# Patient Record
Sex: MALE | Race: WHITE | HISPANIC OR LATINO
[De-identification: names, ages, dates, MRNs, and addresses within clinical notes are randomized per-mention and may not be internally consistent; named-entity substitution may affect disease eponyms.]

---

## 2018-07-27 PROBLEM — Z00.00 ENCOUNTER FOR PREVENTIVE HEALTH EXAMINATION: Status: ACTIVE | Noted: 2018-07-27

## 2018-08-16 ENCOUNTER — APPOINTMENT (OUTPATIENT)
Dept: OTHER | Facility: CLINIC | Age: 58
End: 2018-08-16
Payer: COMMERCIAL

## 2018-08-16 VITALS
HEART RATE: 80 BPM | WEIGHT: 210 LBS | DIASTOLIC BLOOD PRESSURE: 96 MMHG | SYSTOLIC BLOOD PRESSURE: 135 MMHG | OXYGEN SATURATION: 98 % | HEIGHT: 68 IN | BODY MASS INDEX: 31.83 KG/M2 | RESPIRATION RATE: 16 BRPM

## 2018-08-16 DIAGNOSIS — Z03.89 ENCOUNTER FOR OBSERVATION FOR OTHER SUSPECTED DISEASES AND CONDITIONS RULED OUT: ICD-10-CM

## 2018-08-16 DIAGNOSIS — Z78.9 OTHER SPECIFIED HEALTH STATUS: ICD-10-CM

## 2018-08-16 PROCEDURE — 96150: CPT

## 2018-08-16 PROCEDURE — 94060 EVALUATION OF WHEEZING: CPT

## 2018-08-16 PROCEDURE — 99386 PREV VISIT NEW AGE 40-64: CPT

## 2018-08-17 LAB
APPEARANCE: CLEAR
BACTERIA: NEGATIVE
BASOPHILS # BLD AUTO: 0.04 K/UL
BASOPHILS NFR BLD AUTO: 0.5 %
BILIRUBIN URINE: NEGATIVE
BLOOD URINE: NEGATIVE
COLOR: YELLOW
EOSINOPHIL # BLD AUTO: 0.32 K/UL
EOSINOPHIL NFR BLD AUTO: 4.1 %
GLUCOSE QUALITATIVE U: NEGATIVE MG/DL
HCT VFR BLD CALC: 45.4 %
HGB BLD-MCNC: 14.5 G/DL
HYALINE CASTS: 1 /LPF
IMM GRANULOCYTES NFR BLD AUTO: 0.3 %
KETONES URINE: NEGATIVE
LEUKOCYTE ESTERASE URINE: NEGATIVE
LYMPHOCYTES # BLD AUTO: 2.18 K/UL
LYMPHOCYTES NFR BLD AUTO: 27.9 %
MAN DIFF?: NORMAL
MCHC RBC-ENTMCNC: 28.7 PG
MCHC RBC-ENTMCNC: 31.9 GM/DL
MCV RBC AUTO: 89.7 FL
MICROSCOPIC-UA: NORMAL
MONOCYTES # BLD AUTO: 0.55 K/UL
MONOCYTES NFR BLD AUTO: 7 %
NEUTROPHILS # BLD AUTO: 4.71 K/UL
NEUTROPHILS NFR BLD AUTO: 60.2 %
NITRITE URINE: NEGATIVE
PH URINE: 5
PLATELET # BLD AUTO: 318 K/UL
PROTEIN URINE: NEGATIVE MG/DL
RBC # BLD: 5.06 M/UL
RBC # FLD: 14.4 %
RED BLOOD CELLS URINE: 0 /HPF
SPECIFIC GRAVITY URINE: 1.02
SQUAMOUS EPITHELIAL CELLS: 0 /HPF
UROBILINOGEN URINE: NEGATIVE MG/DL
WBC # FLD AUTO: 7.82 K/UL
WHITE BLOOD CELLS URINE: 0 /HPF

## 2018-08-20 LAB
ALBUMIN SERPL ELPH-MCNC: 4.9 G/DL
ALP BLD-CCNC: 68 U/L
ALT SERPL-CCNC: 17 U/L
ANION GAP SERPL CALC-SCNC: 18 MMOL/L
AST SERPL-CCNC: 16 U/L
BILIRUB SERPL-MCNC: 0.3 MG/DL
BUN SERPL-MCNC: 23 MG/DL
CALCIUM SERPL-MCNC: 9.9 MG/DL
CHLORIDE SERPL-SCNC: 99 MMOL/L
CHOLEST SERPL-MCNC: 224 MG/DL
CHOLEST/HDLC SERPL: 4.8 RATIO
CO2 SERPL-SCNC: 22 MMOL/L
CREAT SERPL-MCNC: 1.19 MG/DL
GLUCOSE SERPL-MCNC: 84 MG/DL
HDLC SERPL-MCNC: 47 MG/DL
LDLC SERPL CALC-MCNC: 152 MG/DL
POTASSIUM SERPL-SCNC: 4.6 MMOL/L
PROT SERPL-MCNC: 7.9 G/DL
SODIUM SERPL-SCNC: 138 MMOL/L
TRIGL SERPL-MCNC: 125 MG/DL

## 2018-09-14 PROBLEM — Z03.89 ENCNTR FOR OBS FOR OTH SUSPECTED DISEASES AND COND RULED OUT: Status: ACTIVE | Noted: 2018-09-14

## 2018-10-30 ENCOUNTER — APPOINTMENT (OUTPATIENT)
Dept: OTOLARYNGOLOGY | Facility: CLINIC | Age: 58
End: 2018-10-30
Payer: COMMERCIAL

## 2018-10-30 VITALS
SYSTOLIC BLOOD PRESSURE: 139 MMHG | WEIGHT: 210 LBS | BODY MASS INDEX: 31.83 KG/M2 | HEART RATE: 82 BPM | HEIGHT: 68 IN | DIASTOLIC BLOOD PRESSURE: 82 MMHG

## 2018-10-30 DIAGNOSIS — J01.00 ACUTE MAXILLARY SINUSITIS, UNSPECIFIED: ICD-10-CM

## 2018-10-30 PROCEDURE — 31231 NASAL ENDOSCOPY DX: CPT

## 2018-10-30 PROCEDURE — 99204 OFFICE O/P NEW MOD 45 MIN: CPT | Mod: 25

## 2018-11-29 ENCOUNTER — APPOINTMENT (OUTPATIENT)
Dept: OTOLARYNGOLOGY | Facility: CLINIC | Age: 58
End: 2018-11-29
Payer: COMMERCIAL

## 2018-11-29 VITALS
WEIGHT: 210 LBS | HEART RATE: 93 BPM | HEIGHT: 68 IN | BODY MASS INDEX: 31.83 KG/M2 | SYSTOLIC BLOOD PRESSURE: 128 MMHG | DIASTOLIC BLOOD PRESSURE: 83 MMHG

## 2018-11-29 PROCEDURE — 99214 OFFICE O/P EST MOD 30 MIN: CPT | Mod: 25

## 2018-11-29 PROCEDURE — 31231 NASAL ENDOSCOPY DX: CPT

## 2018-11-29 PROCEDURE — 95004 PERQ TESTS W/ALRGNC XTRCS: CPT

## 2019-05-13 ENCOUNTER — APPOINTMENT (OUTPATIENT)
Dept: OTOLARYNGOLOGY | Facility: CLINIC | Age: 59
End: 2019-05-13

## 2019-08-14 ENCOUNTER — APPOINTMENT (OUTPATIENT)
Dept: OTHER | Facility: CLINIC | Age: 59
End: 2019-08-14
Payer: COMMERCIAL

## 2019-08-14 VITALS
SYSTOLIC BLOOD PRESSURE: 119 MMHG | WEIGHT: 205 LBS | DIASTOLIC BLOOD PRESSURE: 79 MMHG | HEART RATE: 71 BPM | RESPIRATION RATE: 16 BRPM | OXYGEN SATURATION: 96 % | BODY MASS INDEX: 31.07 KG/M2 | HEIGHT: 68 IN

## 2019-08-14 PROCEDURE — 99396 PREV VISIT EST AGE 40-64: CPT | Mod: 25

## 2019-08-14 PROCEDURE — 94010 BREATHING CAPACITY TEST: CPT

## 2019-08-14 PROCEDURE — 99202 OFFICE O/P NEW SF 15 MIN: CPT | Mod: 25

## 2019-08-14 RX ORDER — AMOXICILLIN AND CLAVULANATE POTASSIUM 875; 125 MG/1; MG/1
875-125 TABLET, COATED ORAL
Qty: 14 | Refills: 3 | Status: DISCONTINUED | COMMUNITY
Start: 2018-10-30 | End: 2019-08-14

## 2019-08-14 RX ORDER — METHYLPREDNISOLONE 4 MG/1
4 TABLET ORAL
Qty: 1 | Refills: 0 | Status: DISCONTINUED | COMMUNITY
Start: 2018-10-30 | End: 2019-08-14

## 2019-08-14 NOTE — HEALTH RISK ASSESSMENT
[Patient reported colonoscopy was normal] : Patient reported colonoscopy was normal [ColonoscopyDate] : 08/01/2019

## 2019-08-14 NOTE — REASON FOR VISIT
[Initial Eval - Existing Diagnosis] : an initial evaluation of an existing diagnosis [FreeTextEntry1] : Chronic Rhinitis

## 2019-08-14 NOTE — DISCUSSION/SUMMARY
[Patient seen for WTC Monitoring ___] : Patient was seen for WTC monitoring [unfilled] [Please See Note in Chart and Documentation in Trial DB] : Please see note in chart and documentation in Trial DB. 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  [employer] Heavily contaminated with dust or debris\par  	              Sunday Monday Tuesday Wed Thursday	Friday	Saturday\par   Sept 9- Sept 15	   	   	   	   	   	   	 16\par  Sept 16 - Sept 22	 0	 16	 0	 16	 0	 16	 0\par  Sept 23 - Sept 29	 16	 0	 16	 0	 16	 0	 16\par  Sept 30	 0	   	   	   	   	   	   \par  \par  October	 15 days 	 16 h/day \par \par \par \par Meds		\par    amlodipine	   \par  Meloxicam\par  Multivitamin\par  Bio-flex\par  Metamucil\par  Cardiomegaly\par Cyst on Kidneys\par Arthroscopic Right Knee Surgery/ACL Repair\par Removal of Sebaceous Cyst on shoulder and hip\par Non smoker \par \par \par Afterwards inhaler as a child resolved in teenage walsh \par  Allergies to dander and dust mites since childhood – more frequent now \par \par \par “Sinus issues” – decreased sense of smell, nasal congestion all the time, started after 09 11 2001 around 2002\par Spirometry : NL, \par PE: in trial DB\par Colonoscopy 08 01 2019\par  \par GI Dr Sanchez - [facility] \par PCP Dr Archer proheatlh \par Snoring, interrupted sleep\par  A/P: monitoring MV2\par Recurrent Upper resp/ sinus infections \par  Clinically CR- cont Flonase \par \par

## 2019-08-14 NOTE — HISTORY OF PRESENT ILLNESS
[FreeTextEntry1] : nasal congestion \par  R more than left \par decreased sense of smell \par  snoring \par \par  started to use Flonase with improvement in congestion and snoring \par treated for Sinus infection last year 10 30 2018 \par  feels that his congestion much better

## 2019-08-14 NOTE — PHYSICAL EXAM
[General Appearance - Alert] : alert [General Appearance - In No Acute Distress] : in no acute distress [Outer Ear] : the ears and nose were normal in appearance [Oropharynx] : the oropharynx was normal [Neck Cervical Mass (___cm)] : no neck mass was observed [Neck Appearance] : the appearance of the neck was normal [Jugular Venous Distention Increased] : there was no jugular-venous distention [Thyroid Diffuse Enlargement] : the thyroid was not enlarged [Thyroid Nodule] : there were no palpable thyroid nodules [Auscultation Breath Sounds / Voice Sounds] : lungs were clear to auscultation bilaterally [Heart Rate And Rhythm] : heart rate was normal and rhythm regular [Heart Sounds] : normal S1 and S2 [Heart Sounds Gallop] : no gallops [Murmurs] : no murmurs [Heart Sounds Pericardial Friction Rub] : no pericardial rub [Bowel Sounds] : normal bowel sounds [Abdomen Soft] : soft [Abdomen Tenderness] : non-tender [] : no hepato-splenomegaly [Abdomen Mass (___ Cm)] : no abdominal mass palpated

## 2019-08-14 NOTE — PHYSICAL EXAM
[General Appearance - Alert] : alert [General Appearance - In No Acute Distress] : in no acute distress [Outer Ear] : the ears and nose were normal in appearance [Oropharynx] : the oropharynx was normal [Neck Appearance] : the appearance of the neck was normal [Neck Cervical Mass (___cm)] : no neck mass was observed [Jugular Venous Distention Increased] : there was no jugular-venous distention [Thyroid Diffuse Enlargement] : the thyroid was not enlarged [Thyroid Nodule] : there were no palpable thyroid nodules [Auscultation Breath Sounds / Voice Sounds] : lungs were clear to auscultation bilaterally [Heart Rate And Rhythm] : heart rate was normal and rhythm regular [Heart Sounds] : normal S1 and S2 [Murmurs] : no murmurs [Heart Sounds Gallop] : no gallops [Heart Sounds Pericardial Friction Rub] : no pericardial rub [Bowel Sounds] : normal bowel sounds [Abdomen Soft] : soft [Abdomen Tenderness] : non-tender [Abdomen Mass (___ Cm)] : no abdominal mass palpated [] : no hepato-splenomegaly

## 2019-08-15 LAB
ALBUMIN SERPL ELPH-MCNC: 4.6 G/DL
ALP BLD-CCNC: 77 U/L
ALT SERPL-CCNC: 18 U/L
ANION GAP SERPL CALC-SCNC: 16 MMOL/L
APPEARANCE: CLEAR
AST SERPL-CCNC: 23 U/L
BACTERIA: NEGATIVE
BASOPHILS # BLD AUTO: 0.05 K/UL
BASOPHILS NFR BLD AUTO: 0.7 %
BILIRUB SERPL-MCNC: 0.5 MG/DL
BILIRUBIN URINE: NEGATIVE
BLOOD URINE: NEGATIVE
BUN SERPL-MCNC: 13 MG/DL
CALCIUM SERPL-MCNC: 10.2 MG/DL
CHLORIDE SERPL-SCNC: 100 MMOL/L
CHOLEST SERPL-MCNC: 213 MG/DL
CHOLEST/HDLC SERPL: 5.2 RATIO
CO2 SERPL-SCNC: 24 MMOL/L
COLOR: YELLOW
CREAT SERPL-MCNC: 1.29 MG/DL
EOSINOPHIL # BLD AUTO: 0.43 K/UL
EOSINOPHIL NFR BLD AUTO: 6.3 %
GLUCOSE QUALITATIVE U: NEGATIVE
GLUCOSE SERPL-MCNC: 85 MG/DL
HCT VFR BLD CALC: 47.4 %
HDLC SERPL-MCNC: 41 MG/DL
HGB BLD-MCNC: 14.7 G/DL
HYALINE CASTS: 3 /LPF
IMM GRANULOCYTES NFR BLD AUTO: 0.1 %
KETONES URINE: NEGATIVE
LDLC SERPL CALC-MCNC: 139 MG/DL
LEUKOCYTE ESTERASE URINE: NEGATIVE
LYMPHOCYTES # BLD AUTO: 1.93 K/UL
LYMPHOCYTES NFR BLD AUTO: 28.2 %
MAN DIFF?: NORMAL
MCHC RBC-ENTMCNC: 28.7 PG
MCHC RBC-ENTMCNC: 31 GM/DL
MCV RBC AUTO: 92.6 FL
MICROSCOPIC-UA: NORMAL
MONOCYTES # BLD AUTO: 0.64 K/UL
MONOCYTES NFR BLD AUTO: 9.3 %
NEUTROPHILS # BLD AUTO: 3.79 K/UL
NEUTROPHILS NFR BLD AUTO: 55.4 %
NITRITE URINE: NEGATIVE
PH URINE: 6
PLATELET # BLD AUTO: 311 K/UL
POTASSIUM SERPL-SCNC: 5.1 MMOL/L
PROT SERPL-MCNC: 7.8 G/DL
PROTEIN URINE: NORMAL
RBC # BLD: 5.12 M/UL
RBC # FLD: 13.7 %
RED BLOOD CELLS URINE: 1 /HPF
SODIUM SERPL-SCNC: 139 MMOL/L
SPECIFIC GRAVITY URINE: 1.02
SQUAMOUS EPITHELIAL CELLS: 1 /HPF
TRIGL SERPL-MCNC: 164 MG/DL
UROBILINOGEN URINE: NORMAL
WBC # FLD AUTO: 6.85 K/UL
WHITE BLOOD CELLS URINE: 2 /HPF

## 2020-06-02 ENCOUNTER — TRANSCRIPTION ENCOUNTER (OUTPATIENT)
Age: 60
End: 2020-06-02

## 2020-06-02 ENCOUNTER — APPOINTMENT (OUTPATIENT)
Dept: OTOLARYNGOLOGY | Facility: CLINIC | Age: 60
End: 2020-06-02
Payer: COMMERCIAL

## 2020-06-02 DIAGNOSIS — J30.2 OTHER SEASONAL ALLERGIC RHINITIS: ICD-10-CM

## 2020-06-02 DIAGNOSIS — R09.81 NASAL CONGESTION: ICD-10-CM

## 2020-06-02 DIAGNOSIS — J34.2 DEVIATED NASAL SEPTUM: ICD-10-CM

## 2020-06-02 PROCEDURE — 99214 OFFICE O/P EST MOD 30 MIN: CPT | Mod: 95

## 2020-06-02 NOTE — HISTORY OF PRESENT ILLNESS
[de-identified] : Visit initiated at patient request. This audio / visual (using [employer]) visit is occurring during the\par state of emergency due to COVID-19. Governmental regulation is restricting travel, in-person\par contact, recommend use of remote activities and telemedicine whenever possible. I discussed with\par patient the limitations of telemedicine encounters, including risks associated with the technology\par platform, technical difficulties, data security, and a limited physical exam. There is also a\par limitation in performing diagnostic procedures and patient may need further testing and workup\par to arrive at a diagnosis and treatment plan. We discussed this will be billed as a visit. DALE [facility]LOOM\[facility] understood and elected to proceed at:  2:00PM on: 06/02/2020.\[facility]

## 2020-06-02 NOTE — REASON FOR VISIT
[Home] : at home, [unfilled] , at the time of the visit. [Medical Office: ([facility])___] : at the medical office located in  [Verbal consent obtained from patient] : the patient, [unfilled] [Subsequent Evaluation] : a subsequent evaluation for [FreeTextEntry4] : benedict [FreeTextEntry2] : nasal congestion

## 2020-07-16 ENCOUNTER — APPOINTMENT (OUTPATIENT)
Dept: OTHER | Facility: CLINIC | Age: 60
End: 2020-07-16
Payer: COMMERCIAL

## 2020-07-16 PROCEDURE — 99442: CPT | Mod: 95

## 2020-07-16 PROCEDURE — 99396 PREV VISIT EST AGE 40-64: CPT | Mod: 95

## 2020-07-16 RX ORDER — FLUTICASONE PROPIONATE 50 UG/1
50 SPRAY, METERED NASAL DAILY
Qty: 1 | Refills: 3 | Status: DISCONTINUED | COMMUNITY
Start: 2018-11-29 | End: 2020-07-16

## 2020-07-16 NOTE — HISTORY OF PRESENT ILLNESS
[FreeTextEntry1] : patient with ongoing upper resp complains \par nasal congestion \par  R more than left \par decreased sense of smell \par with R sinus maxillary pressure \par and intermittent R yellow discharge\par \par  using  Flonase nasal spray with improvement in congestion and snoring \par also taking loratadine daily \par

## 2020-07-16 NOTE — REASON FOR VISIT
[Home] : at home, [unfilled] , at the time of the visit. [Other Location: e.g. Home (Enter Location, City,State)___] : at [unfilled] [Verbal consent obtained from patient] : the patient, [unfilled] [Follow-Up] : a follow-up visit [FreeTextEntry1] : Chronic rhinitis

## 2020-07-16 NOTE — DISCUSSION/SUMMARY
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  [employer] Heavily contaminated with dust or debris\par  	              \par   - Sept 15	   	   	   	   	   	   	 16\par   -     0	 16	 0	 16	 0	 16	 0\par  	 16	 0	 16	 0	 16	 0	 16\par  	                 0	   	   	   	   	   	   \par  \par  October	 15 days 	 16 h/day \par \par \par \par Meds		\par    amlodipine	   \par  Meloxicam\par  Multivitamin\par  Bio-flex\par  Metamucil\par PMH: \par  Cardiomegaly\par Cyst on Kidneys\par Arthroscopic Right Knee Surgery/ACL Repair\par Removal of Sebaceous Cyst on shoulder and hip\par Non smoker \par  \par has 2 kids \par  wife  in march form colon cancer he was sick with covid 19 and recovering \par  Allergies to dander and dust mites since childhood – more frequent now \par \par \par “Sinus issues” – decreased sense of smell, nasal congestion all the time, started after 2001 around \par Spirometry :deferred \par PE: deferred \par Colonoscopy 2019\par  \par GI Dr Sanchez - [facility] \par PCP Dr Gianni [email or web address] \par Snoring, interrupted sleep\par  A/P: WTC annual MV3\par Recurrent Upper resp/ sinus infections \par see follow up Occ Med note \par \par

## 2020-07-16 NOTE — DISCUSSION/SUMMARY
[Patient seen for WTC Monitoring ___] : Patient was seen for WTC monitoring [unfilled] [Please See Note in Chart and Documentation in Trial DB] : Please see note in chart and documentation in Trial DB.

## 2020-07-16 NOTE — ASSESSMENT
[FreeTextEntry1] : continue  with Flonase nasal spray  and loratadine \par  recommend to start sinus rinses

## 2021-01-15 ENCOUNTER — RX RENEWAL (OUTPATIENT)
Age: 61
End: 2021-01-15

## 2021-04-19 ENCOUNTER — RX RENEWAL (OUTPATIENT)
Age: 61
End: 2021-04-19

## 2021-05-19 ENCOUNTER — RX RENEWAL (OUTPATIENT)
Age: 61
End: 2021-05-19

## 2021-07-26 ENCOUNTER — APPOINTMENT (OUTPATIENT)
Dept: OTHER | Facility: CLINIC | Age: 61
End: 2021-07-26
Payer: COMMERCIAL

## 2021-07-26 VITALS
SYSTOLIC BLOOD PRESSURE: 117 MMHG | HEART RATE: 66 BPM | WEIGHT: 218 LBS | BODY MASS INDEX: 33.04 KG/M2 | OXYGEN SATURATION: 96 % | RESPIRATION RATE: 16 BRPM | HEIGHT: 68 IN | DIASTOLIC BLOOD PRESSURE: 78 MMHG | TEMPERATURE: 98.5 F

## 2021-07-26 PROCEDURE — 99396 PREV VISIT EST AGE 40-64: CPT | Mod: 25

## 2021-07-26 PROCEDURE — 99212 OFFICE O/P EST SF 10 MIN: CPT | Mod: 25

## 2021-07-26 NOTE — DISCUSSION/SUMMARY
[Patient seen for WTC Monitoring ___] : Patient was seen for WTC monitoring [unfilled] [Please See Note in Chart and Documentation in Trial DB] : Please see note in chart and documentation in Trial DB. 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[employer] Heavily contaminated with dust or debris\par  	              \par   - Sept 15	   	   	   	   	   	   	 16\par   -     0	 16	 0	 16	 0	 16	 0\par  	 16	 0	 16	 0	 16	 0	 16\par  	                 0	   	   	   	   	   	   \par  \par  October	 15 days 	 16 h/day \par \par \par \par \par PMH: \par  Cardiomegaly\par Cyst on Kidneys\par Arthroscopic Right Knee Surgery/ACL Repair\par Removal of Sebaceous Cyst on shoulder and hip\par Non smoker \par  \par has 2 kids \par  wife  in march form colon cancer he was sick with covid 19 and recovering \par  Allergies to dander and dust mites since childhood – more frequent now \par \par \par “Sinus issues” – decreased sense of smell, nasal congestion all the time, started after 2001 around \par Spirometry :deferred \par PE: in trial DB \par Colonoscopy 2019\par  \par GI Dr Sanchez - [facility] \par PCP Dr Archer proheatlh \par Snoring, interrupted sleep\par  A/P: WTC annual MV\par Recurrent Upper resp/ sinus infections \par see follow up Occ Med note \par \par

## 2021-07-26 NOTE — HISTORY OF PRESENT ILLNESS
[FreeTextEntry1] : patient with ongoing upper resp complains \par nasal congestion \par \par  using  Flonase nasal spray with improvement in congestion and snoring \par also taking loratadine daily \par

## 2021-07-26 NOTE — PAST MEDICAL HISTORY
[FreeTextEntry1] : patient is a  that was exposed to [facility] dust and debris after 09 11 2001\par  \par

## 2021-07-26 NOTE — PAST MEDICAL HISTORY
[FreeTextEntry1] : patient is a  that was exposed to [facility] dust and debris after 09 11 2001\par  \par

## 2021-07-30 LAB
ALBUMIN SERPL ELPH-MCNC: 4.6 G/DL
ALP BLD-CCNC: 75 U/L
ALT SERPL-CCNC: 15 U/L
ANION GAP SERPL CALC-SCNC: 12 MMOL/L
APPEARANCE: CLEAR
AST SERPL-CCNC: 18 U/L
BACTERIA: NEGATIVE
BASOPHILS # BLD AUTO: 0.05 K/UL
BASOPHILS NFR BLD AUTO: 0.8 %
BILIRUB SERPL-MCNC: 0.4 MG/DL
BILIRUBIN URINE: NEGATIVE
BLOOD URINE: NEGATIVE
BUN SERPL-MCNC: 19 MG/DL
CALCIUM SERPL-MCNC: 9.6 MG/DL
CHLORIDE SERPL-SCNC: 102 MMOL/L
CHOLEST SERPL-MCNC: 191 MG/DL
CO2 SERPL-SCNC: 22 MMOL/L
COLOR: YELLOW
CREAT SERPL-MCNC: 1.05 MG/DL
EOSINOPHIL # BLD AUTO: 0.42 K/UL
EOSINOPHIL NFR BLD AUTO: 7.1 %
GLUCOSE QUALITATIVE U: NEGATIVE
GLUCOSE SERPL-MCNC: 92 MG/DL
HCT VFR BLD CALC: 43 %
HDLC SERPL-MCNC: 51 MG/DL
HGB BLD-MCNC: 13.7 G/DL
HYALINE CASTS: 0 /LPF
IMM GRANULOCYTES NFR BLD AUTO: 0.2 %
KETONES URINE: NEGATIVE
LDLC SERPL CALC-MCNC: 122 MG/DL
LEUKOCYTE ESTERASE URINE: NEGATIVE
LYMPHOCYTES # BLD AUTO: 1.73 K/UL
LYMPHOCYTES NFR BLD AUTO: 29.1 %
MAN DIFF?: NORMAL
MCHC RBC-ENTMCNC: 29.1 PG
MCHC RBC-ENTMCNC: 31.9 GM/DL
MCV RBC AUTO: 91.5 FL
MICROSCOPIC-UA: NORMAL
MONOCYTES # BLD AUTO: 0.51 K/UL
MONOCYTES NFR BLD AUTO: 8.6 %
NEUTROPHILS # BLD AUTO: 3.23 K/UL
NEUTROPHILS NFR BLD AUTO: 54.2 %
NITRITE URINE: NEGATIVE
NONHDLC SERPL-MCNC: 140 MG/DL
PH URINE: 6
PLATELET # BLD AUTO: 266 K/UL
POTASSIUM SERPL-SCNC: 4.2 MMOL/L
PROT SERPL-MCNC: 7.4 G/DL
PROTEIN URINE: NORMAL
RBC # BLD: 4.7 M/UL
RBC # FLD: 14 %
RED BLOOD CELLS URINE: 1 /HPF
SODIUM SERPL-SCNC: 135 MMOL/L
SPECIFIC GRAVITY URINE: 1.03
SQUAMOUS EPITHELIAL CELLS: 0 /HPF
TRIGL SERPL-MCNC: 92 MG/DL
UROBILINOGEN URINE: NORMAL
WBC # FLD AUTO: 5.95 K/UL
WHITE BLOOD CELLS URINE: 1 /HPF

## 2022-11-25 ENCOUNTER — APPOINTMENT (OUTPATIENT)
Dept: OTHER | Facility: CLINIC | Age: 62
End: 2022-11-25

## 2022-11-25 VITALS
BODY MASS INDEX: 32.58 KG/M2 | OXYGEN SATURATION: 96 % | RESPIRATION RATE: 16 BRPM | SYSTOLIC BLOOD PRESSURE: 130 MMHG | TEMPERATURE: 98.1 F | HEIGHT: 68 IN | DIASTOLIC BLOOD PRESSURE: 84 MMHG | HEART RATE: 71 BPM | WEIGHT: 215 LBS

## 2022-11-25 DIAGNOSIS — Z23 ENCOUNTER FOR IMMUNIZATION: ICD-10-CM

## 2022-11-25 PROCEDURE — 99396 PREV VISIT EST AGE 40-64: CPT | Mod: 25

## 2022-11-25 PROCEDURE — 90686 IIV4 VACC NO PRSV 0.5 ML IM: CPT

## 2022-11-25 PROCEDURE — G0008: CPT

## 2022-11-25 PROCEDURE — 99212 OFFICE O/P EST SF 10 MIN: CPT | Mod: 25

## 2022-11-25 RX ORDER — AMLODIPINE BESYLATE AND BENAZEPRIL HYDROCHLORIDE 10; 40 MG/1; MG/1
10-40 CAPSULE ORAL
Qty: 90 | Refills: 0 | Status: DISCONTINUED | COMMUNITY
Start: 2018-08-08 | End: 2022-11-25

## 2022-11-25 RX ORDER — LORATADINE 10 MG/1
10 TABLET ORAL
Qty: 30 | Refills: 5 | Status: ACTIVE | COMMUNITY
Start: 2019-11-11 | End: 1900-01-01

## 2022-11-25 RX ORDER — ASPIRIN 81 MG
81 TABLET, DELAYED RELEASE (ENTERIC COATED) ORAL
Refills: 0 | Status: COMPLETED | COMMUNITY
End: 2022-11-25

## 2022-11-25 RX ORDER — AMLODIPINE BESYLATE AND BENAZEPRIL HYDROCHLORIDE 5; 10 MG/1; MG/1
5-10 CAPSULE ORAL
Qty: 90 | Refills: 0 | Status: ACTIVE | COMMUNITY
Start: 2022-08-26

## 2022-11-25 RX ORDER — FLUTICASONE PROPIONATE 50 UG/1
50 SPRAY, METERED NASAL
Qty: 16 | Refills: 5 | Status: ACTIVE | COMMUNITY
Start: 2018-10-30 | End: 1900-01-01

## 2022-11-25 RX ORDER — MELOXICAM 15 MG/1
15 TABLET ORAL
Qty: 30 | Refills: 0 | Status: COMPLETED | COMMUNITY
Start: 2018-06-26 | End: 2022-11-25

## 2022-11-27 NOTE — PAST MEDICAL HISTORY
[FreeTextEntry1] : patient is a  that was exposed to [facility] dust and debris after 09 11 2001\par  \par

## 2022-11-27 NOTE — PAST MEDICAL HISTORY
[FreeTextEntry1] : patient is a  that was exposed to [facility] dust and debris after 09 11 2001\par  \par

## 2022-11-27 NOTE — DISCUSSION/SUMMARY
[Patient seen for WTC Monitoring ___] : Patient was seen for WTC monitoring [unfilled] [Please See Note in Chart and Documentation in Trial DB] : Please see note in chart and documentation in Trial DB. 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  [employer] Heavily contaminated with dust or debris\par  	              \par   - Sept 15	   	   	   	   	   	   	 16\par   -     0	 16	 0	 16	 0	 16	 0\par  	 16	 0	 16	 0	 16	 0	 16\par  	                 0	   	   	   	   	   	   \par  \par  October	 15 days 	 16 h/day \par \par \par \par \par PMH: \par  Cardiomegaly\par Cyst on Kidneys\par Arthroscopic Right Knee Surgery/ACL Repair\par Removal of Sebaceous Cyst on shoulder and hip\par Non smoker \par  \par has 2 kids \par  wife   form colon cancer  \par  Allergies to dander and dust mites since childhood – more frequent now \par \par \par “Sinus issues” – decreased sense of smell, nasal congestion all the time, started after 2001 around \par Spirometry :deferred \par PE: in trial DB \par Colonoscopy 2019 and in [place] \par  \par GI Dr Sanchez - [facility] \par PCP Dr Archer proheatlh \par Snoring, interrupted sleep\par  A/P: WTC annual MV\par Recurrent Upper resp/ sinus infections \par see follow up Occ Med note \par \par  influenza vaccination\par

## 2022-11-27 NOTE — HEALTH RISK ASSESSMENT
[Patient reported colonoscopy was normal] : Patient reported colonoscopy was normal [ColonoscopyDate] : 2021

## 2022-11-27 NOTE — HISTORY OF PRESENT ILLNESS
[FreeTextEntry1] : patient with ongoing upper resp complains \par nasal congestion \par \par  using  Flonase nasal spray with improvement in congestion and snoring \par also taking loratadine daily as needed \par

## 2022-11-28 LAB
ALBUMIN SERPL ELPH-MCNC: 4.5 G/DL
ALP BLD-CCNC: 74 U/L
ALT SERPL-CCNC: 18 U/L
ANION GAP SERPL CALC-SCNC: 11 MMOL/L
APPEARANCE: CLEAR
AST SERPL-CCNC: 17 U/L
BACTERIA: NEGATIVE
BASOPHILS # BLD AUTO: 0.06 K/UL
BASOPHILS NFR BLD AUTO: 0.9 %
BILIRUB SERPL-MCNC: 0.3 MG/DL
BILIRUBIN URINE: NEGATIVE
BLOOD URINE: NEGATIVE
BUN SERPL-MCNC: 15 MG/DL
CALCIUM SERPL-MCNC: 9.9 MG/DL
CHLORIDE SERPL-SCNC: 101 MMOL/L
CHOLEST SERPL-MCNC: 257 MG/DL
CO2 SERPL-SCNC: 26 MMOL/L
COLOR: NORMAL
CREAT SERPL-MCNC: 1.15 MG/DL
EGFR: 72 ML/MIN/1.73M2
EOSINOPHIL # BLD AUTO: 0.44 K/UL
EOSINOPHIL NFR BLD AUTO: 6.4 %
GLUCOSE QUALITATIVE U: NEGATIVE
GLUCOSE SERPL-MCNC: 88 MG/DL
HCT VFR BLD CALC: 46.4 %
HDLC SERPL-MCNC: 52 MG/DL
HGB BLD-MCNC: 15 G/DL
HYALINE CASTS: 0 /LPF
IMM GRANULOCYTES NFR BLD AUTO: 0.3 %
KETONES URINE: NEGATIVE
LDLC SERPL CALC-MCNC: 179 MG/DL
LEUKOCYTE ESTERASE URINE: NEGATIVE
LYMPHOCYTES # BLD AUTO: 1.69 K/UL
LYMPHOCYTES NFR BLD AUTO: 24.6 %
MAN DIFF?: NORMAL
MCHC RBC-ENTMCNC: 29 PG
MCHC RBC-ENTMCNC: 32.3 GM/DL
MCV RBC AUTO: 89.7 FL
MICROSCOPIC-UA: NORMAL
MONOCYTES # BLD AUTO: 0.55 K/UL
MONOCYTES NFR BLD AUTO: 8 %
NEUTROPHILS # BLD AUTO: 4.11 K/UL
NEUTROPHILS NFR BLD AUTO: 59.8 %
NITRITE URINE: NEGATIVE
NONHDLC SERPL-MCNC: 205 MG/DL
PH URINE: 5.5
PLATELET # BLD AUTO: 345 K/UL
POTASSIUM SERPL-SCNC: 5.2 MMOL/L
PROT SERPL-MCNC: 7.7 G/DL
PROTEIN URINE: NORMAL
RBC # BLD: 5.17 M/UL
RBC # FLD: 14 %
RED BLOOD CELLS URINE: 1 /HPF
SODIUM SERPL-SCNC: 138 MMOL/L
SPECIFIC GRAVITY URINE: 1.02
SQUAMOUS EPITHELIAL CELLS: 0 /HPF
TRIGL SERPL-MCNC: 130 MG/DL
UROBILINOGEN URINE: NORMAL
WBC # FLD AUTO: 6.87 K/UL
WHITE BLOOD CELLS URINE: 0 /HPF

## 2023-11-14 ENCOUNTER — APPOINTMENT (OUTPATIENT)
Dept: OTHER | Facility: CLINIC | Age: 63
End: 2023-11-14
Payer: COMMERCIAL

## 2023-11-14 VITALS
BODY MASS INDEX: 34.1 KG/M2 | WEIGHT: 225 LBS | DIASTOLIC BLOOD PRESSURE: 78 MMHG | OXYGEN SATURATION: 96 % | HEIGHT: 68 IN | HEART RATE: 92 BPM | SYSTOLIC BLOOD PRESSURE: 124 MMHG | TEMPERATURE: 98.1 F

## 2023-11-14 DIAGNOSIS — Z82.3 FAMILY HISTORY OF STROKE: ICD-10-CM

## 2023-11-14 DIAGNOSIS — J31.0 CHRONIC RHINITIS: ICD-10-CM

## 2023-11-14 DIAGNOSIS — Z04.9 ENCOUNTER FOR EXAMINATION AND OBSERVATION FOR UNSPECIFIED REASON: ICD-10-CM

## 2023-11-14 PROCEDURE — 99396 PREV VISIT EST AGE 40-64: CPT | Mod: 25

## 2023-11-14 PROCEDURE — 99215 OFFICE O/P EST HI 40 MIN: CPT | Mod: 25

## 2023-11-14 PROCEDURE — G0008: CPT

## 2023-11-14 PROCEDURE — 90686 IIV4 VACC NO PRSV 0.5 ML IM: CPT

## 2023-11-14 PROCEDURE — 94010 BREATHING CAPACITY TEST: CPT

## 2023-11-14 RX ORDER — SODIUM CHLORIDE/SODIUM BICARB
PACKET (EA) NASAL
Qty: 1 | Refills: 5 | Status: COMPLETED | COMMUNITY
Start: 2020-07-16 | End: 2023-11-14

## 2023-11-14 RX ORDER — ASPIRIN 81 MG/1
81 TABLET ORAL
Refills: 0 | Status: COMPLETED | COMMUNITY
Start: 2019-08-14 | End: 2023-11-14

## 2023-11-14 RX ORDER — BERBERINE CHLOR/SEAWEED/CHROM 500-250 MG
CAPSULE ORAL
Refills: 0 | Status: ACTIVE | COMMUNITY

## 2023-11-17 LAB
ALBUMIN SERPL ELPH-MCNC: 4.9 G/DL
ALP BLD-CCNC: 81 U/L
ALT SERPL-CCNC: 20 U/L
ANION GAP SERPL CALC-SCNC: 10 MMOL/L
APPEARANCE: CLEAR
AST SERPL-CCNC: 17 U/L
BACTERIA: NEGATIVE /HPF
BASOPHILS # BLD AUTO: 0.06 K/UL
BASOPHILS NFR BLD AUTO: 1 %
BILIRUB SERPL-MCNC: 0.4 MG/DL
BILIRUBIN URINE: NEGATIVE
BLOOD URINE: NEGATIVE
BUN SERPL-MCNC: 16 MG/DL
CALCIUM SERPL-MCNC: 10.3 MG/DL
CAST: 2 /LPF
CHLORIDE SERPL-SCNC: 98 MMOL/L
CHOLEST SERPL-MCNC: 253 MG/DL
CO2 SERPL-SCNC: 26 MMOL/L
COLOR: YELLOW
CREAT SERPL-MCNC: 1.12 MG/DL
EGFR: 73 ML/MIN/1.73M2
EOSINOPHIL # BLD AUTO: 0.26 K/UL
EOSINOPHIL NFR BLD AUTO: 4.4 %
EPITHELIAL CELLS: 1 /HPF
GLUCOSE QUALITATIVE U: NEGATIVE MG/DL
GLUCOSE SERPL-MCNC: 92 MG/DL
HCT VFR BLD CALC: 49 %
HDLC SERPL-MCNC: 43 MG/DL
HGB BLD-MCNC: 16 G/DL
IMM GRANULOCYTES NFR BLD AUTO: 0.2 %
KETONES URINE: NEGATIVE MG/DL
LDLC SERPL CALC-MCNC: 167 MG/DL
LEUKOCYTE ESTERASE URINE: NEGATIVE
LYMPHOCYTES # BLD AUTO: 1.67 K/UL
LYMPHOCYTES NFR BLD AUTO: 28.4 %
MAN DIFF?: NORMAL
MCHC RBC-ENTMCNC: 29.1 PG
MCHC RBC-ENTMCNC: 32.7 GM/DL
MCV RBC AUTO: 89.1 FL
MICROSCOPIC-UA: NORMAL
MONOCYTES # BLD AUTO: 0.46 K/UL
MONOCYTES NFR BLD AUTO: 7.8 %
MUCUS: PRESENT
NEUTROPHILS # BLD AUTO: 3.42 K/UL
NEUTROPHILS NFR BLD AUTO: 58.2 %
NITRITE URINE: NEGATIVE
NONHDLC SERPL-MCNC: 210 MG/DL
PH URINE: 5.5
PLATELET # BLD AUTO: 325 K/UL
POTASSIUM SERPL-SCNC: 5.1 MMOL/L
PROT SERPL-MCNC: 7.9 G/DL
PROTEIN URINE: NEGATIVE MG/DL
RBC # BLD: 5.5 M/UL
RBC # FLD: 13.9 %
RED BLOOD CELLS URINE: 0 /HPF
REVIEW: NORMAL
SODIUM SERPL-SCNC: 134 MMOL/L
SPECIFIC GRAVITY URINE: 1.02
TRIGL SERPL-MCNC: 232 MG/DL
UROBILINOGEN URINE: 0.2 MG/DL
WBC # FLD AUTO: 5.88 K/UL
WHITE BLOOD CELLS URINE: 1 /HPF

## 2024-04-19 NOTE — ASSESSMENT
Cystoscopy - What to Expect     Preparation: Before the procedure, the patient will typically be asked to empty their bladder. No fasting is usually required, but it's a good idea to wear comfortable clothing. The patient may also be given the option of a local anesthetic gel to numb the urethra.     Patient Positioning: The patient will be asked to lie down on an examination table. If you are female you will be asked to position yourself with your feet in stirrups, similar to a gynecological examination. It's essential to ensure the patient is as comfortable as possible. Male patients will stay laying down with their legs on the table.      Sterile Field: You will be undressed from the waist down. The assistant will use betadine or hibiclens to clean the penis or vaginal opening. The healthcare provider will use sterile techniques to maintain a clean environment during the procedure. They will wear gloves and use sterile instruments.     Catheter Insertion: A lubricated cystoscope, which is a thin, flexible tube with a light and camera, is gently inserted into the patient's urethra. Some patients may feel a mild discomfort or pressure during this step.     Visualization: As the cystoscope is advanced through the urethra into the bladder, the healthcare provider will use it to inspect the bladder lining and look for any abnormalities. The patient may be able to see the images on a screen if the procedure is being conducted using a video system.     Biopsy or Treatment: If any abnormalities or concerns are identified during the cystoscopy, the healthcare provider may take a biopsy for further examination or perform any necessary treatments. This could include the removal of bladder stones, tissue samples, or other interventions.     Cystoscopy Completion: Once the examination and any necessary procedures are completed, the cystoscope is gently removed from the bladder, and the procedure is finished.     Recovery:  [FreeTextEntry1] : Patient since last visit is been stable until March was taking care of his wife who passed stage IV cancer that was followed with him getting COVID he is recovered lingered for about a month he is now doing okay better emotionally as well as physically his nasal congestion is stable continuing to use Flonase as well as antihistamines due to his underlying allergies we will follow-up with us in the near future doing well otherwise seems to be recovering both emotionally and physically and started jogging once again. Most patients can resume their normal activities immediately after the cystoscopy. Some may experience mild discomfort or a burning sensation during urination for a short time afterward, but this usually subsides quickly.     Risks and Benefits:     Benefits:     Diagnostic Tool: Flexible cystoscopy is a valuable diagnostic tool that helps your healthcare provider assess and diagnose various urinary tract conditions, including bladder tumors, urinary tract infections, and urinary incontinence.   Minimally Invasive: The procedure is minimally invasive, meaning it involves a small incision or no incision at all, reducing the risk of complications.   Treatment: In some cases, flexible cystoscopy can be used to treat certain urinary tract conditions, such as removing small bladder stones or benign growths.     Risks:     Discomfort: While the procedure is generally well-tolerated, you may experience some discomfort, mild burning during urination, or a feeling of urgency to urinate immediately after the procedure.   Infection: There is a slight risk of urinary tract infection following the procedure, but this is rare.   Bleeding: Minimal bleeding is possible, particularly if a biopsy is taken during the cystoscopy.   Rare Complications: Serious complications are exceedingly rare but may include injury to the urethra or bladder.

## 2024-11-19 ENCOUNTER — APPOINTMENT (OUTPATIENT)
Dept: OTHER | Facility: CLINIC | Age: 64
End: 2024-11-19

## 2024-11-19 VITALS
RESPIRATION RATE: 18 BRPM | DIASTOLIC BLOOD PRESSURE: 95 MMHG | HEIGHT: 68 IN | SYSTOLIC BLOOD PRESSURE: 143 MMHG | BODY MASS INDEX: 33.04 KG/M2 | HEART RATE: 84 BPM | TEMPERATURE: 98.7 F | WEIGHT: 218 LBS | OXYGEN SATURATION: 95 %

## 2024-11-19 DIAGNOSIS — J31.0 CHRONIC RHINITIS: ICD-10-CM

## 2024-11-19 DIAGNOSIS — Z04.9 ENCOUNTER FOR EXAMINATION AND OBSERVATION FOR UNSPECIFIED REASON: ICD-10-CM

## 2024-11-19 PROCEDURE — G0008: CPT

## 2024-11-19 PROCEDURE — 99215 OFFICE O/P EST HI 40 MIN: CPT | Mod: 25

## 2024-11-19 PROCEDURE — 90686 IIV4 VACC NO PRSV 0.5 ML IM: CPT

## 2024-11-19 PROCEDURE — 99397 PER PM REEVAL EST PAT 65+ YR: CPT | Mod: 25

## 2024-11-20 LAB
ALBUMIN SERPL ELPH-MCNC: 4.4 G/DL
ALP BLD-CCNC: 80 U/L
ALT SERPL-CCNC: 17 U/L
ANION GAP SERPL CALC-SCNC: 15 MMOL/L
APPEARANCE: CLEAR
AST SERPL-CCNC: 19 U/L
BACTERIA: NEGATIVE /HPF
BASOPHILS # BLD AUTO: 0.05 K/UL
BASOPHILS NFR BLD AUTO: 0.7 %
BILIRUB SERPL-MCNC: 0.5 MG/DL
BILIRUBIN URINE: NEGATIVE
BLOOD URINE: NEGATIVE
BUN SERPL-MCNC: 18 MG/DL
CALCIUM SERPL-MCNC: 10 MG/DL
CAST: 0 /LPF
CHLORIDE SERPL-SCNC: 101 MMOL/L
CHOLEST SERPL-MCNC: 241 MG/DL
CO2 SERPL-SCNC: 23 MMOL/L
COLOR: YELLOW
CREAT SERPL-MCNC: 1.15 MG/DL
EGFR: 71 ML/MIN/1.73M2
EOSINOPHIL # BLD AUTO: 0.17 K/UL
EOSINOPHIL NFR BLD AUTO: 2.3 %
EPITHELIAL CELLS: 1 /HPF
GLUCOSE QUALITATIVE U: NEGATIVE MG/DL
GLUCOSE SERPL-MCNC: 89 MG/DL
HCT VFR BLD CALC: 48.1 %
HDLC SERPL-MCNC: 52 MG/DL
HGB BLD-MCNC: 15.6 G/DL
IMM GRANULOCYTES NFR BLD AUTO: 0.1 %
KETONES URINE: NEGATIVE MG/DL
LDLC SERPL CALC-MCNC: 172 MG/DL
LEUKOCYTE ESTERASE URINE: NEGATIVE
LYMPHOCYTES # BLD AUTO: 1.21 K/UL
LYMPHOCYTES NFR BLD AUTO: 16.4 %
MAN DIFF?: NORMAL
MCHC RBC-ENTMCNC: 28.5 PG
MCHC RBC-ENTMCNC: 32.4 G/DL
MCV RBC AUTO: 87.8 FL
MICROSCOPIC-UA: NORMAL
MONOCYTES # BLD AUTO: 0.63 K/UL
MONOCYTES NFR BLD AUTO: 8.5 %
NEUTROPHILS # BLD AUTO: 5.32 K/UL
NEUTROPHILS NFR BLD AUTO: 72 %
NITRITE URINE: NEGATIVE
NONHDLC SERPL-MCNC: 190 MG/DL
PH URINE: 6
PLATELET # BLD AUTO: 321 K/UL
POTASSIUM SERPL-SCNC: 4.4 MMOL/L
PROT SERPL-MCNC: 7.5 G/DL
PROTEIN URINE: NEGATIVE MG/DL
RBC # BLD: 5.48 M/UL
RBC # FLD: 14.5 %
RED BLOOD CELLS URINE: 0 /HPF
SODIUM SERPL-SCNC: 138 MMOL/L
SPECIFIC GRAVITY URINE: 1.02
TRIGL SERPL-MCNC: 102 MG/DL
UROBILINOGEN URINE: 0.2 MG/DL
WBC # FLD AUTO: 7.39 K/UL
WHITE BLOOD CELLS URINE: 1 /HPF